# Patient Record
Sex: FEMALE | Race: BLACK OR AFRICAN AMERICAN | NOT HISPANIC OR LATINO | URBAN - METROPOLITAN AREA
[De-identification: names, ages, dates, MRNs, and addresses within clinical notes are randomized per-mention and may not be internally consistent; named-entity substitution may affect disease eponyms.]

---

## 2017-09-01 ENCOUNTER — HOSPITAL ENCOUNTER (EMERGENCY)
Facility: OTHER | Age: 22
Discharge: HOME | End: 2017-09-01
Attending: EMERGENCY MEDICINE
Payer: MEDICAID

## 2017-09-01 VITALS
HEIGHT: 58 IN | OXYGEN SATURATION: 99 % | TEMPERATURE: 98 F | BODY MASS INDEX: 30.44 KG/M2 | HEART RATE: 82 BPM | SYSTOLIC BLOOD PRESSURE: 127 MMHG | WEIGHT: 145 LBS | RESPIRATION RATE: 20 BRPM | DIASTOLIC BLOOD PRESSURE: 69 MMHG

## 2017-09-01 DIAGNOSIS — Z76.89 ENCOUNTER FOR INCISION AND DRAINAGE PROCEDURE: Primary | ICD-10-CM

## 2017-09-01 PROCEDURE — 25000003 PHARM REV CODE 250: Performed by: EMERGENCY MEDICINE

## 2017-09-01 PROCEDURE — 10061 I&D ABSCESS COMP/MULTIPLE: CPT

## 2017-09-01 PROCEDURE — 99283 EMERGENCY DEPT VISIT LOW MDM: CPT | Mod: 25

## 2017-09-01 PROCEDURE — 25000003 PHARM REV CODE 250

## 2017-09-01 RX ORDER — LIDOCAINE HCL/EPINEPHRINE/PF 2%-1:200K
VIAL (ML) INJECTION
Status: COMPLETED
Start: 2017-09-01 | End: 2017-09-01

## 2017-09-01 RX ORDER — OXYCODONE AND ACETAMINOPHEN 5; 325 MG/1; MG/1
2 TABLET ORAL
Status: COMPLETED | OUTPATIENT
Start: 2017-09-01 | End: 2017-09-01

## 2017-09-01 RX ORDER — LIDOCAINE HYDROCHLORIDE AND EPINEPHRINE 20; 10 MG/ML; UG/ML
1 INJECTION, SOLUTION INFILTRATION; PERINEURAL
Status: DISCONTINUED | OUTPATIENT
Start: 2017-09-01 | End: 2017-09-01 | Stop reason: HOSPADM

## 2017-09-01 RX ADMIN — LIDOCAINE HYDROCHLORIDE,EPINEPHRINE BITARTRATE: 20; .005 INJECTION, SOLUTION EPIDURAL; INFILTRATION; INTRACAUDAL; PERINEURAL at 01:09

## 2017-09-01 RX ADMIN — OXYCODONE AND ACETAMINOPHEN 2 TABLET: 5; 325 TABLET ORAL at 12:09

## 2017-09-01 NOTE — ED PROVIDER NOTES
Encounter Date: 9/1/2017       History     Chief Complaint   Patient presents with    Wound Check     buttock      Chief complaint: I need my packing changed  21-year-old who had I&D of an abscess in [place] a few days ago presents for a recheck.  Patient says that she had the wound repacked 2 days ago at urgent care.  She has not filled her antibiotics.  She was prescribed pain medicine that she said is helping a lot.  She denies fever, nausea, abdominal pain.  She does get pain when she sits in certain ways.          Review of patient's allergies indicates:  No Known Allergies  History reviewed. No pertinent past medical history.  History reviewed. No pertinent surgical history.  History reviewed. No pertinent family history.  Social History   Substance Use Topics    Smoking status: Never Smoker    Smokeless tobacco: Never Used    Alcohol use No     Review of Systems   Constitutional: Negative for fever.   Gastrointestinal: Negative for abdominal pain, nausea and vomiting.   Skin:        Abscess       Physical Exam     Initial Vitals   BP Pulse Resp Temp SpO2   09/01/17 1119 09/01/17 1119 09/01/17 1119 09/01/17 1120 09/01/17 1119   139/70 89 18 98.3 °F (36.8 °C) 99 %      MAP       09/01/17 1119       93         Physical Exam    Nursing note and vitals reviewed.  Constitutional: No distress.   HENT:   Head: Atraumatic.   Pulmonary/Chest: No respiratory distress.   Musculoskeletal:        Back:    Neurological: She is alert and oriented to person, place, and time.   Skin: Skin is warm and dry. No abscess noted. No erythema.   Psychiatric: She has a normal mood and affect.         ED Course   I & D - Incision and Drainage  Date/Time: 9/1/2017 1:25 PM  Performed by: TAHIR MUNGUIA.  Authorized by: TAHIR MUNGUIA   Type: abscess  Body area: anogenital  Location details: gluteal cleft  Anesthesia: local infiltration    Anesthesia:  Local Anesthetic: lidocaine 2% with epinephrine  Anesthetic total: 1  mL  Patient sedated: no  Scalpel size: 11  Incision type: single straight  Complexity: complex  Drainage: pus  Drainage amount: moderate  Wound treatment: incision and  drainage  Packing material: 1/2 in iodoform gauze  Patient tolerance: Patient tolerated the procedure well with no immediate complications        Labs Reviewed - No data to display          Medical Decision Making:   Initial Assessment:   21-year-old presents for a recheck of an abscess that was incised and drained at another facility.  On exam patient does have a packed wound that has copious purulent drainage still in the wound  ED Management:  The packing was removed.  I feel that the incision needs furthe incision.   This was done and patient advised to get the antibiotic filled                   ED Course      Clinical Impression:   The encounter diagnosis was Encounter for incision and drainage procedure.                           Nay Ariza MD  09/01/17 9538

## 2017-09-01 NOTE — ED NOTES
Patient presents to the ED to get packing removed from the abscess on her buttocks. Patient  States the pain has improved today but every other day this week she could not sit down. Patient states she has not picked up her antibiotics, so she has not been taking them. No other complaints, denies pain. Will medicate patient and set up MD for I&D and re-packing.

## 2019-01-11 ENCOUNTER — HOSPITAL ENCOUNTER (EMERGENCY)
Facility: HOSPITAL | Age: 24
Discharge: HOME | End: 2019-01-11
Attending: EMERGENCY MEDICINE
Payer: MEDICAID

## 2019-01-11 VITALS
RESPIRATION RATE: 18 BRPM | WEIGHT: 150 LBS | DIASTOLIC BLOOD PRESSURE: 66 MMHG | SYSTOLIC BLOOD PRESSURE: 137 MMHG | TEMPERATURE: 98 F | HEART RATE: 102 BPM | OXYGEN SATURATION: 98 % | HEIGHT: 57 IN | BODY MASS INDEX: 32.36 KG/M2

## 2019-01-11 DIAGNOSIS — N30.01 ACUTE CYSTITIS WITH HEMATURIA: Primary | ICD-10-CM

## 2019-01-11 LAB
B-HCG UR QL: POSITIVE
BILIRUBIN, POC UA: NEGATIVE
BLOOD, POC UA: ABNORMAL
CLARITY, POC UA: CLEAR
COLOR, POC UA: YELLOW
CTP QC/QA: YES
GLUCOSE, POC UA: NEGATIVE
KETONES, POC UA: NEGATIVE
LEUKOCYTE EST, POC UA: ABNORMAL
NITRITE, POC UA: NEGATIVE
PH UR STRIP: 6.5 [PH]
PROTEIN, POC UA: NEGATIVE
SPECIFIC GRAVITY, POC UA: 1.02
UROBILINOGEN, POC UA: 0.2 E.U./DL

## 2019-01-11 PROCEDURE — 87088 URINE BACTERIA CULTURE: CPT

## 2019-01-11 PROCEDURE — 87086 URINE CULTURE/COLONY COUNT: CPT

## 2019-01-11 PROCEDURE — 81025 URINE PREGNANCY TEST: CPT | Mod: ER | Performed by: EMERGENCY MEDICINE

## 2019-01-11 PROCEDURE — 87186 SC STD MICRODIL/AGAR DIL: CPT

## 2019-01-11 PROCEDURE — 99283 EMERGENCY DEPT VISIT LOW MDM: CPT | Mod: 25,ER

## 2019-01-11 PROCEDURE — 87077 CULTURE AEROBIC IDENTIFY: CPT

## 2019-01-11 PROCEDURE — 81003 URINALYSIS AUTO W/O SCOPE: CPT | Mod: ER

## 2019-01-11 RX ORDER — AMOXICILLIN 250 MG/1
250 CAPSULE ORAL 3 TIMES DAILY
Qty: 30 CAPSULE | Refills: 0 | Status: SHIPPED | OUTPATIENT
Start: 2019-01-11 | End: 2019-01-18

## 2019-01-11 NOTE — ED PROVIDER NOTES
Encounter Date: 2019    SCRIBE #1 NOTE: I, Tulio Jones, am scribing for, and in the presence of,  Dr. Ariza. I have scribed the following portions of the note - Other sections scribed: HPI, ROS, PE.       History     Chief Complaint   Patient presents with    Burning with Urination     pt reports she has had burning with urination, vaginal discharge and a vaginal odor since yesterday. denies taking any medicine to help with symptoms. reports to be 12 weeks pregnant at this time. denies vaginal bleeding or pelvic pain.     This is a 23 y.o. female  1 para 0 at 12 weeks gestation by ultrasound done in her OB doctor's office who presents to the ED with a complaint of dysuria at the end of urination that began yesterday. Pt states that she is twelve weeks pregnant. Pt reports yellow vaginal discharge that has been ongoing for few weeks.. Pt states that she had a vaginal bacterial infection at the beginning of her pregnancy. She said that her doctor prefer to treat her later in the pregnancy.  She was told by her OB that she would start a new Abx, but has not yet been Rx a new medication. Pt reports nausea.  She denies fever, emesis, diarrhea, vaginal bleeding, or abdominal pain.        The history is provided by the patient.     Review of patient's allergies indicates:  No Known Allergies  History reviewed. No pertinent past medical history.  History reviewed. No pertinent surgical history.  History reviewed. No pertinent family history.  Social History     Tobacco Use    Smoking status: Never Smoker    Smokeless tobacco: Never Used   Substance Use Topics    Alcohol use: No    Drug use: Not on file     Review of Systems   Constitutional: Negative for fever.   Gastrointestinal: Positive for nausea. Negative for abdominal pain, diarrhea and vomiting.   Genitourinary: Positive for dysuria and vaginal discharge. Negative for vaginal bleeding.       Physical Exam     Initial Vitals [19 1534]    BP Pulse Resp Temp SpO2   137/66 102 18 97.8 °F (36.6 °C) 98 %      MAP       --         Physical Exam    Nursing note and vitals reviewed.  Constitutional: She appears well-developed and well-nourished.   HENT:   Head: Normocephalic and atraumatic.   Eyes: Conjunctivae are normal.   Neck: Normal range of motion. Neck supple.   Cardiovascular: Normal rate, regular rhythm, normal heart sounds and intact distal pulses. Exam reveals no gallop and no friction rub.    No murmur heard.  Pulmonary/Chest: Effort normal and breath sounds normal. No respiratory distress. She has no wheezes. She has no rhonchi. She has no rales. She exhibits no tenderness.   Abdominal: Soft. Bowel sounds are normal. She exhibits no distension and no mass. There is no tenderness. There is no rebound, no guarding and no CVA tenderness.   Musculoskeletal: Normal range of motion.        Lumbar back: She exhibits normal range of motion, no tenderness, no bony tenderness, no swelling and no edema.   Neurological: She is alert and oriented to person, place, and time.   Skin: Skin is warm and dry.   Psychiatric: She has a normal mood and affect.         ED Course   Procedures  Labs Reviewed   POCT URINE PREGNANCY - Abnormal; Notable for the following components:       Result Value    POC Preg Test, Ur Positive (*)     All other components within normal limits   POCT URINALYSIS W/O SCOPE - Abnormal; Notable for the following components:    Glucose, UA Negative (*)     Bilirubin, UA Negative (*)     Ketones, UA Negative (*)     Blood, UA Trace-intact (*)     Protein, UA Negative (*)     Nitrite, UA Negative (*)     Leukocytes, UA Trace (*)     All other components within normal limits   CULTURE, URINE   POCT URINALYSIS W/O SCOPE          Imaging Results    None          Medical Decision Making:   Initial Assessment:   This is a 23 y.o. female who presents to the ED with a complaint of dysuria, ongoing vaginal discharge, and nausea.     The pt's  physical examination produced no significant findings.   Clinical Tests:   Lab Tests: Ordered  ED Management:  I will order a urine culture, UPT and UA.   Patient's urine did show leukocytes.  This was sent for culture.  Spoke with the patient's OBGYN, Dr. Portillo who will see her in his office next week.  Patient and doctor Bandar both felt that pelvic exam should be done by him in the office.  Patient has no abdominal pain. No vaginal bleeding  Patient will be dc 'd with amoxicillin.             Scribe Attestation:   Scribe #1: I performed the above scribed service and the documentation accurately describes the services I performed. I attest to the accuracy of the note.       I, Dr. Nay Ariza, personally performed the services described in this documentation. All medical record entries made by the scribe were at my direction and in my presence.  I have reviewed the chart and agree that the record reflects my personal performance and is accurate and complete. Nay Ariza MD.  6:54 PM 01/11/2019             Clinical Impression:     1. Acute cystitis with hematuria                                   Nay Ariza MD  01/11/19 6551

## 2019-01-13 LAB — BACTERIA UR CULT: NORMAL

## 2019-01-15 ENCOUNTER — TELEPHONE (OUTPATIENT)
Dept: EMERGENCY MEDICINE | Facility: HOSPITAL | Age: 24
End: 2019-01-15

## 2019-03-01 ENCOUNTER — HOSPITAL ENCOUNTER (EMERGENCY)
Facility: HOSPITAL | Age: 24
End: 2019-03-02
Attending: INTERNAL MEDICINE
Payer: MEDICAID

## 2019-03-01 VITALS
TEMPERATURE: 99 F | SYSTOLIC BLOOD PRESSURE: 129 MMHG | WEIGHT: 150 LBS | BODY MASS INDEX: 32.36 KG/M2 | HEART RATE: 106 BPM | OXYGEN SATURATION: 98 % | DIASTOLIC BLOOD PRESSURE: 58 MMHG | HEIGHT: 57 IN | RESPIRATION RATE: 18 BRPM

## 2019-03-01 DIAGNOSIS — R07.89 CHEST DISCOMFORT: ICD-10-CM

## 2019-03-01 DIAGNOSIS — Z3A.17 17 WEEKS GESTATION OF PREGNANCY: Primary | ICD-10-CM

## 2019-03-01 DIAGNOSIS — N10: ICD-10-CM

## 2019-03-01 DIAGNOSIS — O23.02: ICD-10-CM

## 2019-03-01 DIAGNOSIS — E87.6 HYPOKALEMIA: ICD-10-CM

## 2019-03-01 LAB
ALBUMIN SERPL-MCNC: 3.2 G/DL (ref 3.3–5.5)
ALP SERPL-CCNC: 52 U/L (ref 42–141)
B-HCG UR QL: POSITIVE
BILIRUB SERPL-MCNC: 0.5 MG/DL (ref 0.2–1.6)
BILIRUBIN, POC UA: NEGATIVE
BLOOD, POC UA: ABNORMAL
BUN SERPL-MCNC: 4 MG/DL (ref 7–22)
CALCIUM SERPL-MCNC: 9.9 MG/DL (ref 8–10.3)
CHLORIDE SERPL-SCNC: 106 MMOL/L (ref 98–108)
CLARITY, POC UA: CLEAR
COLOR, POC UA: YELLOW
CREAT SERPL-MCNC: 0.5 MG/DL (ref 0.6–1.2)
CTP QC/QA: YES
GLUCOSE SERPL-MCNC: 93 MG/DL (ref 73–118)
GLUCOSE, POC UA: NEGATIVE
HCO3 UR-SCNC: 20.1 MMOL/L (ref 24–28)
KETONES, POC UA: ABNORMAL
LDH SERPL L TO P-CCNC: 1.2 MMOL/L (ref 0.5–2.2)
LEUKOCYTE EST, POC UA: ABNORMAL
NITRITE, POC UA: NEGATIVE
PCO2 BLDA: 35.1 MMHG (ref 35–45)
PH SMN: 7.37 [PH] (ref 7.35–7.45)
PH UR STRIP: 6.5 [PH]
PO2 BLDA: 67 MMHG (ref 40–60)
POC ALT (SGPT): 17 U/L (ref 10–47)
POC AST (SGOT): 23 U/L (ref 11–38)
POC BE: -5 MMOL/L
POC SATURATED O2: 93 % (ref 95–100)
POC TCO2: 21 MMOL/L (ref 24–29)
POC TCO2: 25 MMOL/L (ref 18–33)
POTASSIUM BLD-SCNC: 3 MMOL/L (ref 3.6–5.1)
PROTEIN, POC UA: ABNORMAL
PROTEIN, POC: 6.7 G/DL (ref 6.4–8.1)
SAMPLE: ABNORMAL
SODIUM BLD-SCNC: 137 MMOL/L (ref 128–145)
SPECIFIC GRAVITY, POC UA: 1.01
UROBILINOGEN, POC UA: 0.2 E.U./DL

## 2019-03-01 PROCEDURE — 82803 BLOOD GASES ANY COMBINATION: CPT | Mod: ER

## 2019-03-01 PROCEDURE — 25000003 PHARM REV CODE 250: Mod: ER | Performed by: INTERNAL MEDICINE

## 2019-03-01 PROCEDURE — 80053 COMPREHEN METABOLIC PANEL: CPT | Mod: ER

## 2019-03-01 PROCEDURE — 93005 ELECTROCARDIOGRAM TRACING: CPT | Mod: ER

## 2019-03-01 PROCEDURE — 96374 THER/PROPH/DIAG INJ IV PUSH: CPT | Mod: ER

## 2019-03-01 PROCEDURE — 81003 URINALYSIS AUTO W/O SCOPE: CPT | Mod: ER

## 2019-03-01 PROCEDURE — 81025 URINE PREGNANCY TEST: CPT | Mod: ER | Performed by: EMERGENCY MEDICINE

## 2019-03-01 PROCEDURE — 93010 EKG 12-LEAD: ICD-10-PCS | Mod: ,,, | Performed by: INTERNAL MEDICINE

## 2019-03-01 PROCEDURE — 99285 EMERGENCY DEPT VISIT HI MDM: CPT | Mod: 25,ER

## 2019-03-01 PROCEDURE — 96375 TX/PRO/DX INJ NEW DRUG ADDON: CPT | Mod: ER

## 2019-03-01 PROCEDURE — 93010 ELECTROCARDIOGRAM REPORT: CPT | Mod: ,,, | Performed by: INTERNAL MEDICINE

## 2019-03-01 PROCEDURE — 85025 COMPLETE CBC W/AUTO DIFF WBC: CPT | Mod: ER

## 2019-03-01 PROCEDURE — 63600175 PHARM REV CODE 636 W HCPCS: Mod: ER | Performed by: INTERNAL MEDICINE

## 2019-03-01 RX ORDER — ACETAMINOPHEN 500 MG
1000 TABLET ORAL
Status: COMPLETED | OUTPATIENT
Start: 2019-03-01 | End: 2019-03-01

## 2019-03-01 RX ORDER — CEFTRIAXONE 1 G/1
1 INJECTION, POWDER, FOR SOLUTION INTRAMUSCULAR; INTRAVENOUS
Status: COMPLETED | OUTPATIENT
Start: 2019-03-01 | End: 2019-03-01

## 2019-03-01 RX ORDER — SODIUM CHLORIDE 9 MG/ML
1000 INJECTION, SOLUTION INTRAVENOUS ONCE
Status: COMPLETED | OUTPATIENT
Start: 2019-03-02 | End: 2019-03-01

## 2019-03-01 RX ORDER — POTASSIUM CHLORIDE 20 MEQ/1
60 TABLET, EXTENDED RELEASE ORAL
Status: COMPLETED | OUTPATIENT
Start: 2019-03-01 | End: 2019-03-01

## 2019-03-01 RX ORDER — ONDANSETRON 2 MG/ML
8 INJECTION INTRAMUSCULAR; INTRAVENOUS
Status: COMPLETED | OUTPATIENT
Start: 2019-03-01 | End: 2019-03-01

## 2019-03-01 RX ADMIN — SODIUM CHLORIDE 1000 ML: 0.9 INJECTION, SOLUTION INTRAVENOUS at 11:03

## 2019-03-01 RX ADMIN — CEFTRIAXONE SODIUM 1 G: 1 INJECTION, POWDER, FOR SOLUTION INTRAMUSCULAR; INTRAVENOUS at 08:03

## 2019-03-01 RX ADMIN — ONDANSETRON 8 MG: 2 INJECTION INTRAMUSCULAR; INTRAVENOUS at 08:03

## 2019-03-01 RX ADMIN — ACETAMINOPHEN 1000 MG: 500 TABLET, FILM COATED ORAL at 09:03

## 2019-03-01 RX ADMIN — POTASSIUM CHLORIDE 60 MEQ: 1500 TABLET, EXTENDED RELEASE ORAL at 09:03

## 2019-03-02 PROCEDURE — 25000003 PHARM REV CODE 250: Mod: ER | Performed by: INTERNAL MEDICINE

## 2019-03-02 NOTE — ED PROVIDER NOTES
Encounter Date: 3/1/2019    SCRIBE #1 NOTE: I, Tulio Jones, am scribing for, and in the presence of,  Dr. Scales. I have scribed the following portions of the note - Other sections scribed: HPI, ROS, PE.       History     Chief Complaint   Patient presents with    Back Pain     pt reports she began having lower back pain yesterday at work, was transported to ER and dx with a UTI. pt reports today, the back pain got worse and radiates to the middle of her chest. denies SOB, dizziness or any other associated symptoms. pt reports to be 17 weeks pregnant at this time. Pt reports ultrasound was performed yesterday      This is a 23 y.o. 17 WGA female who presents to the ED with a complaint of acute right flank pain that began yesterday while she was at work.  Her pain is constant.  She denies trauma or injury to her back.  She was seen for this problem at [facility] last night and dx 'd with a UTI.  She was treated with tylenol and Nitrofurantoin, but just started taking this medication today.  Palpation and movement worsens her pain.  She rates her pain a seven out of ten at rest and a ten out of ten when moving.  Pt endorses nausea and emesis.  She denies fever.  Pt is pregnant.        The history is provided by the patient.   Back Pain    This is a new problem. The current episode started yesterday. The problem occurs constantly. The pain is associated with no known injury. The pain is present in the lumbar spine. The pain does not radiate. The pain is at a severity of 10/10. The symptoms are aggravated by bending and twisting. Pertinent negatives include no fever. Treatments tried: Tylenol and Nitrofurantoin. The treatment provided no relief.     Review of patient's allergies indicates:  No Known Allergies  History reviewed. No pertinent past medical history.  History reviewed. No pertinent surgical history.  History reviewed. No pertinent family history.  Social History     Tobacco Use    Smoking status:  Never Smoker    Smokeless tobacco: Never Used   Substance Use Topics    Alcohol use: No    Drug use: Not on file     Review of Systems   Constitutional: Negative for fever.   Gastrointestinal: Positive for nausea and vomiting.   Genitourinary: Positive for flank pain.   Musculoskeletal: Positive for back pain.   All other systems reviewed and are negative.      Physical Exam     Initial Vitals [03/01/19 1855]   BP Pulse Resp Temp SpO2   115/71 (!) 118 18 98.4 °F (36.9 °C) 98 %      MAP       --         Physical Exam    Nursing note and vitals reviewed.  Constitutional: She appears well-developed and well-nourished.   HENT:   Head: Normocephalic and atraumatic.   Eyes: Conjunctivae are normal.   Neck: Normal range of motion. Neck supple.   Cardiovascular: Normal rate, regular rhythm, normal heart sounds and intact distal pulses. Exam reveals no gallop and no friction rub.    No murmur heard.  Pulses:       Radial pulses are 2+ on the right side, and 2+ on the left side.        Dorsalis pedis pulses are 2+ on the right side, and 2+ on the left side.   Pulmonary/Chest: Effort normal and breath sounds normal. No respiratory distress. She has no wheezes. She has no rhonchi. She has no rales. She exhibits no tenderness.   Abdominal: Soft. Bowel sounds are normal. She exhibits no distension and no mass. There is no tenderness. There is CVA tenderness (Right). There is no rigidity, no rebound and no guarding.   Musculoskeletal: Normal range of motion.        Lumbar back: She exhibits tenderness and pain. She exhibits normal range of motion, no bony tenderness, no swelling, no edema, no deformity, no laceration, no spasm and normal pulse.        Back:    Right flank tenderness to palpation.    Neurological: She is alert and oriented to person, place, and time.   Skin: Skin is warm and dry. Capillary refill takes less than 2 seconds.   Psychiatric: She has a normal mood and affect.         ED Course   Procedures  Labs  Reviewed   POCT URINE PREGNANCY - Abnormal; Notable for the following components:       Result Value    POC Preg Test, Ur Positive (*)     All other components within normal limits   POCT URINALYSIS W/O SCOPE - Abnormal; Notable for the following components:    Glucose, UA Negative (*)     Bilirubin, UA Negative (*)     Ketones, UA 3+ (*)     Blood, UA Trace-lysed (*)     Protein, UA Trace (*)     Nitrite, UA Negative (*)     Leukocytes, UA 1+ (*)     All other components within normal limits   POCT CMP - Abnormal; Notable for the following components:    Albumin, POC 3.2 (*)     POC BUN 4 (*)     POC Creatinine 0.5 (*)     POC Potassium 3.0 (*)     All other components within normal limits   POCT URINALYSIS W/O SCOPE   POCT CBC   POCT CMP          Imaging Results    None          Medical Decision Making:   Initial Assessment:   This is a 23 y.o. female who presents to the ED with a complaint of acute right flank pain that began yesterday while she was at work.  Her pain is constant.  She denies trauma or injury to her back.  She was seen for this problem at [facility] last night and dx 'd with a UTI.  She was treated with tylenol and Nitrofurantoin, but just started taking this medication today.  Palpation and movement worsens her pain.  She rates her pain a seven out of ten at rest and a ten out of ten when moving.  Pt endorses nausea and emesis.  She denies fever.  Pt is pregnant.   Clinical Tests:   Lab Tests: Ordered and Reviewed  Medical Tests: Ordered and Reviewed  ED Management:  Patient was given Rocephin, Zofran, normal saline and Tylenol in the emergency department.  CBC reveals white blood cell count of 14.9 with a leftward shift and slight anemia.  CMP reveals potassium of 3.0.  Patient received oral potassium replacement in the emergency department.  Urinalysis revealed pyuria with 1+ leukocytes.  Called utilization Management for transfer of this patient with acute pyelonephritis and pregnancy.   Also called Dr. Portillo, her OBGYN, for consultation at 9:35 p.m..  Patient was accepted to [facility] antepartum unit by Dr. Tomlinson at 10:47 p.m.            Scribe Attestation:   Scribe #1: I performed the above scribed service and the documentation accurately describes the services I performed. I attest to the accuracy of the note.    This document was produced by a scribe under my direction and in my presence. I agree with the content of the note and have made any necessary edits.     Dr. Scales    03/01/2019 9:16 PM             Clinical Impression:     1. 17 weeks gestation of pregnancy    2. Chest discomfort    3. Acute pyelonephritis in second trimester, antepartum           Disposition:   Disposition: Discharged  Condition: Stable                        Farhat Scales MD  03/01/19 4049

## 2019-05-30 ENCOUNTER — HOSPITAL ENCOUNTER (EMERGENCY)
Facility: HOSPITAL | Age: 24
Discharge: HOME | End: 2019-05-30
Attending: EMERGENCY MEDICINE
Payer: MEDICAID

## 2019-05-30 VITALS
WEIGHT: 150 LBS | RESPIRATION RATE: 18 BRPM | HEART RATE: 86 BPM | BODY MASS INDEX: 32.36 KG/M2 | SYSTOLIC BLOOD PRESSURE: 120 MMHG | DIASTOLIC BLOOD PRESSURE: 68 MMHG | HEIGHT: 57 IN | TEMPERATURE: 99 F | OXYGEN SATURATION: 100 %
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
## 2019-05-30 DIAGNOSIS — M62.830 BACK SPASM: ICD-10-CM

## 2019-05-30 DIAGNOSIS — M25.539 WRIST PAIN: ICD-10-CM

## 2019-05-30 PROCEDURE — 25000003 PHARM REV CODE 250: Mod: ER | Performed by: NURSE PRACTITIONER

## 2019-05-30 PROCEDURE — 99283 EMERGENCY DEPT VISIT LOW MDM: CPT | Mod: 25,ER

## 2019-05-30 RX ORDER — DEXTROMETHORPHAN HYDROBROMIDE, GUAIFENESIN 5; 100 MG/5ML; MG/5ML
650 LIQUID ORAL EVERY 8 HOURS
Qty: 30 TABLET | Refills: 0 | Status: SHIPPED | OUTPATIENT
Start: 2019-05-30

## 2019-05-30 RX ORDER — LIDOCAINE 50 MG/G
1 PATCH TOPICAL DAILY
Qty: 15 PATCH | Refills: 0 | Status: SHIPPED | OUTPATIENT
Start: 2019-05-30

## 2019-05-30 RX ORDER — ACETAMINOPHEN 325 MG/1
650 TABLET ORAL
Status: COMPLETED | OUTPATIENT
Start: 2019-05-30 | End: 2019-05-30

## 2019-05-30 RX ORDER — NAPROXEN SODIUM 220 MG/1
81 TABLET, FILM COATED ORAL DAILY
COMMUNITY

## 2019-05-30 RX ADMIN — ACETAMINOPHEN 650 MG: 325 TABLET, FILM COATED ORAL at 07:05
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
## 2019-05-31 NOTE — DISCHARGE INSTRUCTIONS
Thank you for allowing me to care for you today.  I hope our treatment plan will make you feel better in the next few days.  In order for me to take better care of my future patients and improve our Emergency Department, I would appreciate if you can provide us with feedback.  In the next few days, you may receive a survey in the mail.  If you do, it would mean a great deal to me if you would please take the time to complete it.    Thank you and I hope you feel better.  Clau Hogue NP

## 2020-02-10 PROBLEM — Z3A.17 17 WEEKS GESTATION OF PREGNANCY: Status: RESOLVED | Noted: 2019-03-01 | Resolved: 2020-02-10
